# Patient Record
Sex: FEMALE | Race: WHITE
[De-identification: names, ages, dates, MRNs, and addresses within clinical notes are randomized per-mention and may not be internally consistent; named-entity substitution may affect disease eponyms.]

---

## 2020-02-19 ENCOUNTER — HOSPITAL ENCOUNTER (EMERGENCY)
Dept: HOSPITAL 25 - UCEAST | Age: 20
Discharge: HOME | End: 2020-02-19
Payer: COMMERCIAL

## 2020-02-19 VITALS — SYSTOLIC BLOOD PRESSURE: 128 MMHG | DIASTOLIC BLOOD PRESSURE: 75 MMHG

## 2020-02-19 DIAGNOSIS — J10.1: Primary | ICD-10-CM

## 2020-02-19 LAB — FLUAV RNA SPEC QL NAA+PROBE: POSITIVE

## 2020-02-19 PROCEDURE — G0463 HOSPITAL OUTPT CLINIC VISIT: HCPCS

## 2020-02-19 PROCEDURE — 99202 OFFICE O/P NEW SF 15 MIN: CPT

## 2020-02-19 NOTE — UC
FLU HPI





- HPI Summary


HPI Summary: 





Pt presents to  reporting 2 days of body aches, congestion, fatigue.  Tactile 

temps, chills. mild cough.  Patient states she lives in a sorority and there 

are several people with both once again and found to be.  Patient is not 

immunocompromised.  Patient states is when she felt a little bit woozy and 

lightheaded after urinating.  Patient is drinking fluids.  No analgesic or 

antipyretic taken today.  Patient to get the flu vaccine.  Patient is a [employer] 

student.  Patient is not pregnant.  Patient's medications is under the EMR by 

triage review this visit.





- History of Current Complaint


Chief Complaint: UCGeneralIllness


Stated Complaint: FLU LIKE SYMPTOMS


Time Seen by Provider: 02/19/20 07:30


Hx Obtained From: Patient


Hx Last Menstrual Period: march 9


Severity Currently: Moderate


Severity Initially: Moderate


Pain Intensity: 6


Pain Scale Used: 0-10 Numeric





- Allergy/Home Medications


Allergies/Adverse Reactions: 


 Allergies











Allergy/AdvReac Type Severity Reaction Status Date / Time


 


No Known Allergies Allergy   Verified 02/19/20 07:14











Home Medications: 


 Home Medications





Spironolactone TAB* [Aldactone TAB*] 50 mg PO DAILY 02/19/20 [History Confirmed 

02/19/20]











PMH/Surg Hx/FS Hx/Imm Hx


Previously Healthy: Yes





- Surgical History


Surgical History: None





- Family History


Known Family History: Positive: Non-Contributory





- Social History


Occupation: Student


Lives: Dormitory/Roommates


Alcohol Use: Occasionally


Substance Use Type: None


Smoking Status (MU): Never Smoked Tobacco





Review of Systems


All Other Systems Reviewed And Are Negative: Yes


Constitutional: Positive: Fever, Chills, Fatigue


ENT: Positive: Sore Throat, Nasal Discharge, Sinus Congestion


Respiratory: Positive: Cough


Cardiovascular: Positive: Negative


Gastrointestinal: Positive: Negative


Genitourinary: Positive: Negative





Physical Exam





- Summary


Physical Exam Summary: 





Vital Signs Reviewed: Yes


A+Ox3, congested, mild intermittent cough


Eyes: Conjunctiva Clear, COREEN. EOM intact and full


ENT: Hearing grossly normal  TM x 2 clear, mmoist, uvula midline, no exudate, 

no erythema


Neck: Positive: Supple


Respiratory: Positive: No respiratory distress, No accessory muscle use + CTA 

throughout  no w/r, intermittent cough, speaking full easy sentences


Cardiovascular: RRR  nl s1, s2  no m/r  CBT <2  sec


abd soft + BS nt/nd  no guarding, no distension


Musculoskeletal Exam: [place] x 4 without difficulty Strength Intact, ROM Intact


Neurological: Positive: Alert,  + sensation throughout


Psychological: Positive: Normal Response To examiner


Skin: Positive: no rash, no ecchymosis


Triage Information Reviewed: Yes


Vital Signs: 


 Initial Vital Signs











Temp  98.7 F   02/19/20 07:16


 


Pulse  112   02/19/20 07:16


 


Resp  18   02/19/20 07:16


 


BP  128/75   02/19/20 07:16


 


Pulse Ox  99   02/19/20 07:16














Flu Course/Dx





- Course


Course Of Treatment: 





Patient presents to urgent care concerned she has the flu.  Patient has had 2 

days of congestion cough fatigue body aches and tactile temperatures.  Patient 

took some Tylenol yesterday but no other over-the-counter medication and no 

other medications today.  Patient states she is not pregnant.  On exam vital 

shows elevated temperature.  Patient with intermittent cough and sinus 

congestion but not toxic appearing.  Patient drinking water without difficulty.

  Patient's influenza A is positive.  Will start patient on Tamiflu.  Discussed 

with patient secretion precautions.  Humidified air.  Over-the-counter 

medications for symptomatic support.  We'll give patient a note for class this 

week.  Patient declined operative doctor parents.  Strict return precautions 

discussed.  Patient comfortable and in agreement with plan





- Differential Dx/Diagnosis


Provider Diagnosis: 


 Influenza A








Discharge ED





- Sign-Out/Discharge


Documenting (check all that apply): Patient Departure


All imaging exams completed and their final reports reviewed: No Studies





- Discharge Plan


Condition: Stable


Disposition: HOME


Prescriptions: 


Oseltamivir Phosphate [Tamiflu] 75 mg PO BID #10 capsule


Patient Education Materials:  Influenza (ED)


Forms:  *School Release


Referrals: 


[facility] [Provider Group]


No Primary Care Phys,[facility] [Primary Care Provider] - 


Additional Instructions: 


- Stay well hydrated. Drink plenty of non-alcoholic, non-caffinated beverages.


- Take Tamiflu as prescribed


- Alternate ibuprofen (Advil, Motrin) 600mg and acetaminophen (Tylenol) every 3 

hours for pain or fever.  Take with food. Do NOT take for more than 4-5 days. 


- These infections are spread by secretions - do NOT share eating or drinking 

utensils - clean items you share with other people such as cell phones, 

computer mouse, TV remote, computer tablets,etc. Once you start to feel better, 

change your toothbrush and your pillowcase.


- get plenty of restful sleep


- humidify the air in the room where you sleep - boil water, run a hot steam 

shower, vaporizer, cups of water by heat register


- okay to take over the counter decongestant and cough medication


- contact your doctor or return with questions or concerns





- Billing Disposition and Condition


Condition: STABLE


Disposition: Home

## 2024-01-08 ENCOUNTER — OFFICE (OUTPATIENT)
Dept: URBAN - METROPOLITAN AREA CLINIC 29 | Facility: CLINIC | Age: 24
Setting detail: OPHTHALMOLOGY
End: 2024-01-08
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
## 2024-01-08 PROCEDURE — NO SHOW FE NO SHOW FEE: Performed by: OPHTHALMOLOGY

## 2024-01-19 ENCOUNTER — OFFICE (OUTPATIENT)
Dept: URBAN - METROPOLITAN AREA CLINIC 29 | Facility: CLINIC | Age: 24
Setting detail: OPHTHALMOLOGY
End: 2024-01-19
Payer: COMMERCIAL

## 2024-01-19 DIAGNOSIS — H01.004: ICD-10-CM

## 2024-01-19 DIAGNOSIS — H01.001: ICD-10-CM

## 2024-01-19 DIAGNOSIS — H47.093: ICD-10-CM

## 2024-01-19 DIAGNOSIS — H52.13: ICD-10-CM

## 2024-01-19 PROCEDURE — 92015 DETERMINE REFRACTIVE STATE: CPT | Performed by: OPHTHALMOLOGY

## 2024-01-19 PROCEDURE — 92014 COMPRE OPH EXAM EST PT 1/>: CPT | Performed by: OPHTHALMOLOGY

## 2024-01-19 PROCEDURE — 92250 FUNDUS PHOTOGRAPHY W/I&R: CPT | Performed by: OPHTHALMOLOGY

## 2024-01-19 ASSESSMENT — REFRACTION_MANIFEST
OS_CYLINDER: +0.75
OS_VA1: 20/20
OS_CYLINDER: +0.75
OD_CYLINDER: +0.75
OD_AXIS: 165
OS_SPHERE: -5.00
OD_AXIS: 165
OS_AXIS: 30
OD_CYLINDER: +0.75
OS_VA1: 20/20
OD_VA1: 20/20
OS_AXIS: 015
OD_VA1: 20/20-1
OS_SPHERE: -4.75
OD_SPHERE: -4.25
OD_SPHERE: -4.00

## 2024-01-19 ASSESSMENT — SPHEQUIV_DERIVED
OD_SPHEQUIV: -4
OS_SPHEQUIV: -4.625
OS_SPHEQUIV: -4.375
OD_SPHEQUIV: -3.875
OS_SPHEQUIV: -4.875
OD_SPHEQUIV: -3.625

## 2024-01-19 ASSESSMENT — REFRACTION_CURRENTRX
OS_AXIS: 040
OD_AXIS: 160
OD_AXIS: 155
OS_VPRISM_DIRECTION: SV
OD_CYLINDER: +0.75
OS_SPHERE: -4.75
OS_OVR_VA: 20/
OS_AXIS: 030
OD_OVR_VA: 20/
OS_CYLINDER: +0.50
OD_OVR_VA: 20/
OD_VPRISM_DIRECTION: SV
OS_SPHERE: -4.25
OD_SPHERE: -3.50
OD_CYLINDER: +1.00
OS_OVR_VA: 20/
OD_SPHERE: -4.00
OS_CYLINDER: +0.75

## 2024-01-19 ASSESSMENT — REFRACTION_AUTOREFRACTION
OS_SPHERE: -5.25
OD_AXIS: 160
OD_CYLINDER: +0.50
OS_AXIS: 020
OD_SPHERE: -4.25
OS_CYLINDER: +0.75

## 2024-01-19 ASSESSMENT — CONFRONTATIONAL VISUAL FIELD TEST (CVF)
OD_FINDINGS: FULL
OS_FINDINGS: FULL

## 2025-03-17 ENCOUNTER — OFFICE (OUTPATIENT)
Dept: URBAN - METROPOLITAN AREA CLINIC 29 | Facility: CLINIC | Age: 25
Setting detail: OPHTHALMOLOGY
End: 2025-03-17
Payer: COMMERCIAL

## 2025-03-17 DIAGNOSIS — H01.001: ICD-10-CM

## 2025-03-17 DIAGNOSIS — H57.04: ICD-10-CM

## 2025-03-17 DIAGNOSIS — H01.004: ICD-10-CM

## 2025-03-17 DIAGNOSIS — H47.093: ICD-10-CM

## 2025-03-17 DIAGNOSIS — H16.223: ICD-10-CM

## 2025-03-17 PROCEDURE — 92014 COMPRE OPH EXAM EST PT 1/>: CPT | Performed by: OPHTHALMOLOGY

## 2025-03-17 ASSESSMENT — REFRACTION_CURRENTRX
OS_SPHERE: -4.25
OS_OVR_VA: 20/
OD_VPRISM_DIRECTION: SV
OS_OVR_VA: 20/
OD_AXIS: 155
OD_SPHERE: -3.50
OS_VPRISM_DIRECTION: SV
OS_CYLINDER: +0.75
OS_CYLINDER: +0.50
OS_SPHERE: -5.00
OS_SPHERE: -4.75
OS_AXIS: 017
OD_AXIS: 160
OD_OVR_VA: 20/
OD_SPHERE: -4.00
OS_AXIS: 030
OD_AXIS: 160
OD_SPHERE: -4.25
OD_CYLINDER: +0.75
OD_CYLINDER: +1.00
OD_OVR_VA: 20/
OD_CYLINDER: +0.75
OS_AXIS: 040
OS_OVR_VA: 20/
OD_OVR_VA: 20/
OS_CYLINDER: +0.75

## 2025-03-17 ASSESSMENT — REFRACTION_MANIFEST
OS_AXIS: 30
OD_CYLINDER: +0.75
OD_AXIS: 175
OS_CYLINDER: +0.75
OD_SPHERE: -4.25
OD_VA1: 20/20-1
OS_VA1: 20/20
OS_AXIS: 015
OS_VA1: 20/20
OD_VA1: 20/20-1
OD_AXIS: 165
OS_CYLINDER: +0.75
OD_CYLINDER: +0.75
OD_SPHERE: -4.00
OD_AXIS: 165
OD_CYLINDER: +0.75
OS_VA1: 20/20
OS_SPHERE: -5.00
OS_SPHERE: -4.75
OD_VA1: 20/20
OS_SPHERE: -4.75
OS_AXIS: 015
OD_SPHERE: -4.25
OS_CYLINDER: +0.50

## 2025-03-17 ASSESSMENT — TEAR BREAK UP TIME (TBUT)
OS_TBUT: 1+
OD_TBUT: 1+

## 2025-03-17 ASSESSMENT — CONFRONTATIONAL VISUAL FIELD TEST (CVF)
OD_FINDINGS: FULL
OS_FINDINGS: FULL

## 2025-03-17 ASSESSMENT — KERATOMETRY
OD_AXISANGLE_DEGREES: 155
OS_K1POWER_DIOPTERS: 43.50
OS_K2POWER_DIOPTERS: 44.25
OD_K2POWER_DIOPTERS: 43.25
OD_K1POWER_DIOPTERS: 42.75
OS_AXISANGLE_DEGREES: 176

## 2025-03-17 ASSESSMENT — REFRACTION_AUTOREFRACTION
OS_CYLINDER: +0.75
OD_AXIS: 175
OD_SPHERE: -3.75
OS_AXIS: 10
OD_CYLINDER: +0.25
OS_SPHERE: -4.75

## 2025-03-17 ASSESSMENT — TONOMETRY
OD_IOP_MMHG: 18
OS_IOP_MMHG: 18

## 2025-03-17 ASSESSMENT — VISUAL ACUITY
OS_BCVA: 20/20
OD_BCVA: 20/20-1